# Patient Record
Sex: MALE | Race: WHITE | NOT HISPANIC OR LATINO | Employment: UNEMPLOYED | URBAN - METROPOLITAN AREA
[De-identification: names, ages, dates, MRNs, and addresses within clinical notes are randomized per-mention and may not be internally consistent; named-entity substitution may affect disease eponyms.]

---

## 2024-05-08 ENCOUNTER — APPOINTMENT (OUTPATIENT)
Dept: GENERAL RADIOLOGY | Facility: CLINIC | Age: 49
End: 2024-05-08
Attending: PHYSICIAN ASSISTANT

## 2024-05-08 ENCOUNTER — HOSPITAL ENCOUNTER (EMERGENCY)
Facility: CLINIC | Age: 49
Discharge: HOME | End: 2024-05-08
Attending: FAMILY MEDICINE | Admitting: FAMILY MEDICINE

## 2024-05-08 VITALS
RESPIRATION RATE: 20 BRPM | SYSTOLIC BLOOD PRESSURE: 152 MMHG | HEART RATE: 99 BPM | OXYGEN SATURATION: 98 % | DIASTOLIC BLOOD PRESSURE: 92 MMHG

## 2024-05-08 DIAGNOSIS — S68.119A TRAUMATIC AMPUTATION OF FINGER OF LEFT HAND, INITIAL ENCOUNTER: ICD-10-CM

## 2024-05-08 PROCEDURE — 99283 EMERGENCY DEPT VISIT LOW MDM: CPT | Mod: FS | Performed by: FAMILY MEDICINE

## 2024-05-08 PROCEDURE — 90471 IMMUNIZATION ADMIN: CPT | Performed by: PHYSICIAN ASSISTANT

## 2024-05-08 PROCEDURE — 12041 INTMD RPR N-HF/GENIT 2.5CM/<: CPT | Performed by: FAMILY MEDICINE

## 2024-05-08 PROCEDURE — 90715 TDAP VACCINE 7 YRS/> IM: CPT | Performed by: PHYSICIAN ASSISTANT

## 2024-05-08 PROCEDURE — 96375 TX/PRO/DX INJ NEW DRUG ADDON: CPT | Performed by: FAMILY MEDICINE

## 2024-05-08 PROCEDURE — 99284 EMERGENCY DEPT VISIT MOD MDM: CPT | Mod: 25 | Performed by: FAMILY MEDICINE

## 2024-05-08 PROCEDURE — 96366 THER/PROPH/DIAG IV INF ADDON: CPT | Performed by: FAMILY MEDICINE

## 2024-05-08 PROCEDURE — 73130 X-RAY EXAM OF HAND: CPT | Mod: 26 | Performed by: RADIOLOGY

## 2024-05-08 PROCEDURE — 96365 THER/PROPH/DIAG IV INF INIT: CPT | Performed by: FAMILY MEDICINE

## 2024-05-08 PROCEDURE — 250N000013 HC RX MED GY IP 250 OP 250 PS 637: Performed by: PHYSICIAN ASSISTANT

## 2024-05-08 PROCEDURE — 250N000011 HC RX IP 250 OP 636: Performed by: PHYSICIAN ASSISTANT

## 2024-05-08 PROCEDURE — 73130 X-RAY EXAM OF HAND: CPT | Mod: LT

## 2024-05-08 RX ORDER — CEFAZOLIN SODIUM 2 G/100ML
2 INJECTION, SOLUTION INTRAVENOUS ONCE
Status: COMPLETED | OUTPATIENT
Start: 2024-05-08 | End: 2024-05-08

## 2024-05-08 RX ORDER — OXYCODONE HYDROCHLORIDE 5 MG/1
5 TABLET ORAL EVERY 6 HOURS PRN
Qty: 6 TABLET | Refills: 0 | Status: SHIPPED | OUTPATIENT
Start: 2024-05-08 | End: 2024-05-11

## 2024-05-08 RX ORDER — LORAZEPAM 2 MG/ML
1 INJECTION INTRAMUSCULAR ONCE
Status: COMPLETED | OUTPATIENT
Start: 2024-05-08 | End: 2024-05-08

## 2024-05-08 RX ORDER — OXYCODONE HYDROCHLORIDE 5 MG/1
5 TABLET ORAL ONCE
Status: COMPLETED | OUTPATIENT
Start: 2024-05-08 | End: 2024-05-08

## 2024-05-08 RX ORDER — LIDOCAINE HYDROCHLORIDE 10 MG/ML
INJECTION, SOLUTION EPIDURAL; INFILTRATION; INTRACAUDAL; PERINEURAL
Status: DISCONTINUED
Start: 2024-05-08 | End: 2024-05-08 | Stop reason: HOSPADM

## 2024-05-08 RX ORDER — LIDOCAINE HYDROCHLORIDE 10 MG/ML
10 INJECTION, SOLUTION INFILTRATION; PERINEURAL ONCE
Status: DISCONTINUED | OUTPATIENT
Start: 2024-05-08 | End: 2024-05-08 | Stop reason: HOSPADM

## 2024-05-08 RX ADMIN — OXYCODONE HYDROCHLORIDE 5 MG: 5 TABLET ORAL at 17:59

## 2024-05-08 RX ADMIN — CLOSTRIDIUM TETANI TOXOID ANTIGEN (FORMALDEHYDE INACTIVATED), CORYNEBACTERIUM DIPHTHERIAE TOXOID ANTIGEN (FORMALDEHYDE INACTIVATED), BORDETELLA PERTUSSIS TOXOID ANTIGEN (GLUTARALDEHYDE INACTIVATED), BORDETELLA PERTUSSIS FILAMENTOUS HEMAGGLUTININ ANTIGEN (FORMALDEHYDE INACTIVATED), BORDETELLA PERTUSSIS PERTACTIN ANTIGEN, AND BORDETELLA PERTUSSIS FIMBRIAE 2/3 ANTIGEN 0.5 ML: 5; 2; 2.5; 5; 3; 5 INJECTION, SUSPENSION INTRAMUSCULAR at 15:04

## 2024-05-08 RX ADMIN — HYDROMORPHONE HYDROCHLORIDE 1 MG: 1 INJECTION, SOLUTION INTRAMUSCULAR; INTRAVENOUS; SUBCUTANEOUS at 14:59

## 2024-05-08 RX ADMIN — CEFAZOLIN SODIUM 2 G: 2 INJECTION, SOLUTION INTRAVENOUS at 15:01

## 2024-05-08 ASSESSMENT — COLUMBIA-SUICIDE SEVERITY RATING SCALE - C-SSRS
1. IN THE PAST MONTH, HAVE YOU WISHED YOU WERE DEAD OR WISHED YOU COULD GO TO SLEEP AND NOT WAKE UP?: NO
6. HAVE YOU EVER DONE ANYTHING, STARTED TO DO ANYTHING, OR PREPARED TO DO ANYTHING TO END YOUR LIFE?: NO
2. HAVE YOU ACTUALLY HAD ANY THOUGHTS OF KILLING YOURSELF IN THE PAST MONTH?: NO

## 2024-05-08 ASSESSMENT — ACTIVITIES OF DAILY LIVING (ADL)
ADLS_ACUITY_SCORE: 35

## 2024-05-08 NOTE — ED TRIAGE NOTES
Arrives ambulatory with a laceration to left fourth digit. Per patient he was climbing up the hill when he noticed part of his finger was missing. Denies being on thinners.     Triage Assessment (Adult)       Row Name 05/08/24 1425          Triage Assessment    Airway WDL WDL        Respiratory WDL    Respiratory WDL WDL        Skin Circulation/Temperature WDL    Skin Circulation/Temperature WDL WDL        Cardiac WDL    Cardiac WDL WDL        Peripheral/Neurovascular WDL    Peripheral Neurovascular WDL WDL        Cognitive/Neuro/Behavioral WDL    Cognitive/Neuro/Behavioral WDL WDL

## 2024-05-08 NOTE — CONSULTS
[facility] Orthopaedic Surgery Consultation    Miguel Wilkerson MRN# [id number]   Age: 48 year old YOB: 1975   Date of Admission: 5/8/2024    Reason for consult: Left ring finger amputation   Requesting physician: No att. providers found                Impression and Recommendation (Resident / Clinician):   Impression:  Miguel Wilkerson is a Right-hand dominant 48 year old male with a significant PMH of methamphetamine use who presents with left ring finger traumatic amputation through the middle phalanx.      Recommendations:  - Revision amputation completed in the ED  - Follow up in clinic within the week.    PROCEDURE NOTE:  After verbal informed consent was obtained, and the patient elected to proceed, a brief time out was held in accordance with hospital policy confirming the correct patient, procedure, site, and side.  Digital block was obtained with 25 cc of 1% lidocaine.  Once adequate anesthesia was confirmed, the patients finger was washed with saline and iodine solution.  The bone was debrided with rongeur back to a level where it was felt to be sufficiently covered with soft tissue.  Old suture was seen in the tissue while debriding, and found to be not intact or tight anything so this was removed.  Hemostasis achieved with pressure.  Once again the patient's finger was washed with saline.  Bacitracin and Adaptic dressing placed with sterile gauze and Coban.  Patient was instructed to keep dressing in place until he follows up in clinic early next week.    Activity: CCWB LUE. ROM as tolerated.   Wound Cares/Dressing/Splint: Dressings to remain in place, clean, dry, until clinic follow-up  DVT PPx: DVT ppx per primary team  Antibiotic: Antibiotic selection per primary team, received 1 dose Ancef while in the ED.  Cultures: None  Labs: None needed   Imaging: No further imaging needed at this time  PT/OT: Work on ROM, strengthening, gait training, mobility, and ADLs  Dispo: Per Primary team    Operative  Plan: No plan for operative intervention at this time.      Thank you for allowing me to participate in this patient's care. Please do not hesitate to contact me with any questions or concerns.    Lakeisha Rich MD  PGY-1  Orthopaedic Surgery    Please page me at [phone] with any questions/concerns. If there is no timely response, if it is a weekend, or if it is during evening hours, please page the orthopaedic surgery resident on call.         Chief Complaint:   Left ring finger amputation          History of Present Illness (Resident / Clinician):   Patient reports earlier today he was climbing on the bank [facility] and he saw a rock that he wanted to get up on the hill and he tried to climb the hill and then slipped and slid down the hill about 40 feet, in the process of sliding down the hill he reach out with his left hand to grab a rock and missed, and then realized that part of his finger was missing.  He went looking for his finger and could not find the amputated finger.  He found a hair tie on the ground and tied it around his finger as a tourniquet and walked himself to [place] emergency department.     Of note, he has a remote history of trauma from some metal blinds to his left ring finger where he had a laceration repaired in childhood, 2 years ago dog bit him in this finger, again the laceration was repaired.     History obtained from patient interview and chart review. All pertinent ROS information is included above.         Review of Systems (not addressed in HPI):   Head:  denies headache, dizziness, light headedness  Eyes: denies changes in vision, blurred vision, diplopia, excessive tearing  Ears: denies hearing loss, pain  Nose: denies nasal congestion   Mouth: denies new oral sores, ulcers, dry mouth  Throat:  denies recent soreness, hoarseness, difficulty swallowing  Neck/Lymphatics:  denies new lumps,  swollen glands , neck pain or stiffness  Urinary: denies dysuria, hematuria,  polyuria, nocturia   Endocrine:  denies heat or cold intolerance, excessive sweating, polyuria, polydipsia, polyphagia   Psychiatric: denies excessive depression, sleep disturbances, substance abuse         Past Medical History:   No past medical history on file.  Patient denies any personal history of bleeding disorders, clotting disorders, or adverse reactions to anesthesia.         Past Surgical History:   No past surgical history on file.         Social History:     Social History     Socioeconomic History    Marital status: Single     Spouse name: Not on file    Number of children: Not on file    Years of education: Not on file    Highest education level: Not on file   Occupational History    Not on file   Tobacco Use    Smoking status: Not on file    Smokeless tobacco: Not on file   Substance and Sexual Activity    Alcohol use: Not on file    Drug use: Not on file    Sexual activity: Not on file   Other Topics Concern    Not on file   Social History Narrative    Not on file     Social Determinants of Health     Financial Resource Strain: Not on file   Food Insecurity: Not on file   Transportation Needs: Not on file   Physical Activity: Not on file   Stress: Not on file   Social Connections: Not on file   Interpersonal Safety: Not on file   Housing Stability: Not on file     Occupation: Used to work as a , 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          Family History:   No family history on file.    Patient denies known family history of bleeding, clotting, or anesthesia related complications.            Allergies:   No Known Allergies          Medications:     Prior to Admission medications    Medication Sig Last Dose Taking? Auth Provider Long Term End Date   amoxicillin-clavulanate (AUGMENTIN) 875-125 MG tablet Take 1 tablet by mouth 2 times daily for 7 days  Yes Britton Nugent PA-C   5/15/24   oxyCODONE (ROXICODONE) 5 MG tablet Take 1 tablet (5 mg) by mouth every 6 hours as needed for severe pain  Yes Britton Nugent PA-C  5/11/24     Medication reviewed with patient and in chart.          Physical Exam:     Vitals:    05/08/24 1422 05/08/24 1423 05/08/24 1726   BP:  (!) 157/91 (!) 152/92   Pulse:  99    Resp:  20    TempSrc: Oral Oral    SpO2:  98%      General: Awake, alert, appropriate, following commands, NAD  Neuro: CN II-XII grossly intact  Skin: No rashes, lumps, or bumps; skin color normal  HEENT: Normocephalic, atraumatic; EOMs grossly intact; external ear without erythema or edema bilaterally; no septal deviation  Lungs: Breaths nonlabored, without wheezes or stridor  Heart/Cardiovascular: Regular pulse, no peripheral cyanosis  Left Upper Extremity: Traumatic amputation through the middle phalanx of the left ring finger.  Initially after removing tourniquet there was pulsatile bleeding, this subsided with pressure.  Patient is able to flex and extend the PIP and MCP completely. Sensation intact to light touch on the radial and ulnar aspect of the ring finger.  There are multiple dry cracked skin cracks on the tips of the patient's remaining fingertips.   No significant tenderness to palpation over clavicle, AC joint, shoulder, arm, elbow, forearm, wrist. Normal ROM shoulder, elbow, wrist without pain. Motor intact distally with finger flexion/extension/intrinsics/EPL, OK sign. SILT ax/m/r/u nerve distributions. Radial pulse palpable, 2+.             Imaging:   Review of imaging below demonstrates amputation of the ring finger through the middle phalanx.  Homemade tourniquet in place.    XR Hand Left G/E 3 Views    Result Date: 5/8/2024  3 views left hand radiographs 5/8/2024 2:56 PM History: Traumatic amputation 4th digit Comparison: None Findings: PA, oblique and lateral view(s) of the left hand were obtained. Amputation of the fourth digit at the level of the mid to distal shaft  "of the fourth middle phalanx. Adjacent foci of soft tissue mineralization, likely posttraumatic however small foreign bodies are difficult to exclude. Mild degenerative changes of the first carpometacarpal and triscaphe joints.  Soft tissue is unremarkable.     Impression: Amputation of the fourth digit at the level of the mid to distal shaft of the fourth middle phalanx. Adjacent foci of soft tissue mineralization, likely posttraumatic however small foreign bodies are difficult to exclude. CHERYL CAREY MD (Joe)   SYSTEM ID:  [id number]           Labs:   CBC:  No results found for: \"WBC\", \"HGB\", \"PLT\"    BMP:  No results found for: \"NA\", \"POTASSIUM\", \"CHLORIDE\", \"CO2\", \"BUN\", \"CR\", \"ANIONGAP\", \"TERI\", \"GLC\"    Inflammatory Markers:  No results found for: \"WBC\", \"CRP\", \"SED\"        "

## 2024-05-08 NOTE — DISCHARGE INSTRUCTIONS
Keep your bandages clean and dry.  They should remain in place until you follow-up with the hand surgeon.  They will call you for an appointment.  Return to the ER if you have worsening pain, fever, or pus from the wound.

## 2024-05-08 NOTE — ED PROVIDER NOTES
ED Provider Note  [facility]      History     Chief Complaint   Patient presents with    Laceration     HPI  49yo right-hand-dominant M  no reported PMHx p/w traumatic amputation of the left ring finger just prior to arrival.  [place]  States that he started to slide down the embankment, attempted to stop sliding, and is unsure how, but believes finger may have gotten caught in a rock.  He denies other injuries.  States that he attempted to search for finger but could not find it.  Applied a rubber band as a tourniquet, achieving hemostasis.  He endorses mild pain, but intact sensation.  Has not attempted to clean wound.  Believes tetanus is up-to-date.    Past Medical History  No past medical history on file.  No past surgical history on file.  amoxicillin-clavulanate (AUGMENTIN) 875-125 MG tablet  oxyCODONE (ROXICODONE) 5 MG tablet      No Known Allergies  Family History  No family history on file.  Social History          A medically appropriate review of systems was performed with pertinent positives and negatives noted in the HPI, and all other systems negative.    Physical Exam   BP: (!) 157/91  Pulse: 99  Resp: 20  SpO2: 98 %  Physical Exam  Constitutional:       General: He is not in acute distress.     Appearance: He is well-developed. He is not diaphoretic.   HENT:      Head: Normocephalic and atraumatic.      Nose: Nose normal. No congestion.      Mouth/Throat:      Mouth: Mucous membranes are moist.      Pharynx: Oropharynx is clear.   Eyes:      Pupils: Pupils are equal, round, and reactive to light.   Cardiovascular:      Rate and Rhythm: Normal rate.   Pulmonary:      Effort: Pulmonary effort is normal.   Musculoskeletal:         General: Deformity present. Normal range of motion.        Hands:       Cervical back: Normal range of motion and neck supple.   Lymphadenopathy:      Cervical: No cervical  adenopathy.   Skin:     General: Skin is warm and dry.      Capillary Refill: Capillary refill takes less than 2 seconds.   Neurological:      Mental Status: He is alert.                   ED Course, Procedures, & Data      Procedures               Results for orders placed or performed during the hospital encounter of 05/08/24   XR Hand Left G/E 3 Views     Status: None    Narrative    3 views left hand radiographs 5/8/2024 2:56 PM    History: Traumatic amputation 4th digit     Comparison: None    Findings:    PA, oblique and lateral view(s) of the left hand were obtained.     Amputation of the fourth digit at the level of the mid to distal shaft  of the fourth middle phalanx. Adjacent foci of soft tissue  mineralization, likely posttraumatic however small foreign bodies are  difficult to exclude.    Mild degenerative changes of the first carpometacarpal and triscaphe  joints.      Soft tissue is unremarkable.      Impression    Impression:  Amputation of the fourth digit at the level of the mid to distal shaft  of the fourth middle phalanx. Adjacent foci of soft tissue  mineralization, likely posttraumatic however small foreign bodies are  difficult to exclude.    HCERYL CAREY MD (Joe)         SYSTEM ID:  [id number]     Medications   pharmacy alert - intermittent dosing (has no administration in time range)   lidocaine 1 % injection 10 mL (has no administration in time range)   lidocaine (PF) (XYLOCAINE) 1 % injection (has no administration in time range)   ceFAZolin (ANCEF) 2 g in 100 mL D5W intermittent infusion (0 g Intravenous Stopped 5/8/24 1719)   Tdap (tetanus-diphtheria-acell pertussis) (ADACEL) injection 0.5 mL (0.5 mLs Intramuscular $Given 5/8/24 1504)   HYDROmorphone (DILAUDID) injection 1 mg (1 mg Intravenous $Given 5/8/24 1459)   LORazepam (ATIVAN) injection 1 mg (1 mg Intravenous Not Given 5/8/24 1652)     Labs Ordered and Resulted from Time of ED Arrival to Time of ED Departure - No data to  display  XR Hand Left G/E 3 Views   Final Result   Impression:   Amputation of the fourth digit at the level of the mid to distal shaft   of the fourth middle phalanx. Adjacent foci of soft tissue   mineralization, likely posttraumatic however small foreign bodies are   difficult to exclude.      CHERYL CAREY MD (Joe)            SYSTEM ID:  [id number]             Critical care was not performed.     Medical Decision Making  The patient's presentation was of high complexity (an acute health issue posing potential threat to life or bodily function).    The patient's evaluation involved:  review of external note(s) from 3+ sources (clinical)  ordering and/or review of 1 test(s) in this encounter (see separate area of note for details)  discussion of management or test interpretation with another health professional (orthopedics)    The patient's management necessitated high risk (a decision regarding hospitalization).    Assessment & Plan    47yo right-hand-dominant M  no reported PMHx p/w traumatic amputation of the left ring finger just prior to arrival.  [place]  States that he started to slide down the embankment, attempted to stop sliding, and is unsure how, but believes finger may have gotten caught in a rock.  He denies other injuries.  States that he attempted to search for finger but could not find it.  Applied a rubber band as a tourniquet, achieving hemostasis.  He endorses mild pain, but intact sensation.  Has not attempted to clean wound.  Believes tetanus is up-to-date.      On exam in the ED, patient has a amputation of his left fourth digit distal to the PIP.  The wound is hemostatic with her band as tourniquet.  He has intact sensation and ROM.  We were unable to confirm patient's tetanus status, so he will receive that here today.  He was prophylaxed with Ancef.  He received Dilaudid for pain, and 1 mg of Ativan for anxiety/agitation.  Orthopedics was  consulted who saw patient and closed wound.  Patient will be discharged with a prescription for Augmentin, pain control with oxycodone, and orthopedics follow-up (they will call him for appointment).  Strict ER return precautions given for infectious symptoms (redness, warmth, pus, fever) or significantly worsening pain.          I have reviewed the nursing notes. I have reviewed the findings, diagnosis, plan and need for follow up with the patient.    New Prescriptions    AMOXICILLIN-CLAVULANATE (AUGMENTIN) 875-125 MG TABLET    Take 1 tablet by mouth 2 times daily for 7 days    OXYCODONE (ROXICODONE) 5 MG TABLET    Take 1 tablet (5 mg) by mouth every 6 hours as needed for severe pain       Final diagnoses:   Traumatic amputation of finger of left hand, initial encounter         Britton Nugent PA-C  [facility] EMERGENCY DEPARTMENT  5/8/2024     Britton Nugent PA-C  05/08/24 8678    --    ED Attending Physician Attestation    I Erick Tomlinson MD, cared for this patient with the Advanced Practice Provider (ADRIEL). I have performed a history and physical examination of the patient independent of the ADRIEL. I reviewed the ADRIEL's documentation above and agree with the documented findings and plan of care. I personally provided a substantive portion of the care for this patient, including the complete Medical Decision Making. Please see the ADRIEL's documentation for full details.    Summary of HPI, PE, ED Course   Patient is a 48 year old male evaluated in the emergency department for left ring finger partial amputation. Exam and ED course notable for xray done with amputation at midphalanyx of left ring finger. Ortho consult with tdap, iv antibiosis pain control and tx in er by ortho. Patient home with tx and follow up. After the completion of care in the emergency department, the patient was discharged.      Erick Tomlinson MD  Emergency Medicine     This note was created at least in part by the use  of dragon voice dictation system. Inadvertent typographical errors may still exist.  Erick Tomlinson MD.  Patient evaluated in the emergency department during the COVID-19 pandemic period. Careful attention to patients safety was addressed throughout the evaluation. Evaluation and treatment management was initiated with disposition made efficiently and appropriate as possible to minimize any risk of potential exposure to patient during this evaluation.               Erick Tomlinson MD  05/08/24 7660

## 2024-05-09 ENCOUNTER — TELEPHONE (OUTPATIENT)
Dept: ORTHOPEDICS | Facility: CLINIC | Age: 49
End: 2024-05-09

## 2024-05-09 NOTE — TELEPHONE ENCOUNTER
Attempted to call pt. No numbers on file. Please verify pt's number and schedule pt with Dr. Lopez on Monday when he calls.      -[facility]      ----- Message from Lisa Mccrary ATC sent at 5/9/2024  9:15 AM CDT -----  Regarding: RE: Appt scheduling  Yes, can see John on Monday at 8 if it is still open or double book at 11:40 or 3:30. Otherwise you can use of the [employer] same day slots. Thanks!    Lisa Mccrary [facility]  ----- Message -----  From: Melva Hatch ATC  Sent: 5/9/2024   9:09 AM CDT  To: Lisa Mccrary ATC; Micheal Zhao ATC  Subject: FW: Appt scheduling                              Can we schedule this pt with hand?      -[facility]  ----- Message -----  From: Lakeisha Rich MD  Sent: 5/8/2024   6:36 PM CDT  To: [facility]  Subject: Appt scheduling                                  Hello,    Please schedule patient for wound check of his left ring finger traumatic amputation within 1 week.    Thanks!  Lakeisha

## 2024-05-13 ENCOUNTER — TELEPHONE (OUTPATIENT)
Dept: ORTHOPEDICS | Facility: CLINIC | Age: 49
End: 2024-05-13

## 2024-05-13 NOTE — TELEPHONE ENCOUNTER
Letter sent to pt to have him call back to schedule.      -[facility]      ----- Message from KEI Beach sent at 5/13/2024  2:19 PM CDT -----  Regarding: RE: Appt scheduling  Yes, please.  It appears he does not have insurance listed.  Please have him call the [phone] number to get scheduled.  Thanks!  ----- Message -----  From: Melva Hatch ATC  Sent: 5/10/2024  10:30 AM CDT  To: KEI Beach  Subject: FW: Appt scheduling                              Pt doesn't have myChart and no numbers listed in his chart. He did call once and we told call center to schedule with hand but have not seen him got scheduled yet. Should we send him a letter?    -[facility]  ----- Message -----  From: Lakeisha Rich MD  Sent: 5/8/2024   6:36 PM CDT  To: [facility]  Subject: Appt scheduling                                  Hello,    Please schedule patient for wound check of his left ring finger traumatic amputation within 1 week.    Thanks!  Lakeisha